# Patient Record
Sex: FEMALE | Race: WHITE | Employment: STUDENT | URBAN - NONMETROPOLITAN AREA
[De-identification: names, ages, dates, MRNs, and addresses within clinical notes are randomized per-mention and may not be internally consistent; named-entity substitution may affect disease eponyms.]

---

## 2022-12-13 ENCOUNTER — OFFICE VISIT (OUTPATIENT)
Dept: ENT CLINIC | Age: 22
End: 2022-12-13

## 2022-12-13 VITALS
BODY MASS INDEX: 21.52 KG/M2 | SYSTOLIC BLOOD PRESSURE: 106 MMHG | WEIGHT: 114 LBS | HEIGHT: 61 IN | DIASTOLIC BLOOD PRESSURE: 70 MMHG

## 2022-12-13 DIAGNOSIS — H61.23 BILATERAL IMPACTED CERUMEN: Primary | ICD-10-CM

## 2022-12-13 PROCEDURE — 69210 REMOVE IMPACTED EAR WAX UNI: CPT | Performed by: PHYSICIAN ASSISTANT

## 2022-12-13 PROCEDURE — 99202 OFFICE O/P NEW SF 15 MIN: CPT | Performed by: PHYSICIAN ASSISTANT

## 2022-12-13 RX ORDER — SPIRONOLACTONE 50 MG/1
TABLET, FILM COATED ORAL
COMMUNITY
Start: 2022-11-30

## 2022-12-13 RX ORDER — PAROXETINE HYDROCHLORIDE 20 MG/1
20 TABLET, FILM COATED ORAL EVERY MORNING
COMMUNITY

## 2022-12-13 ASSESSMENT — ENCOUNTER SYMPTOMS
FACIAL SWELLING: 0
RHINORRHEA: 0
TROUBLE SWALLOWING: 0
SORE THROAT: 0
EYE PAIN: 0
SINUS PAIN: 0
VOICE CHANGE: 0
SINUS PRESSURE: 0
EYE DISCHARGE: 0

## 2022-12-13 NOTE — PROGRESS NOTES
[facility] OTOLARYNGOLOGY/ENT  Delphine Cohen is a pleasant 51-year-old  female that was referred by Dr. Jamaica Montes due to problems with cerumen impaction and tinnitus. Patient reports that the tinnitus was noted after she had appreciated diminished hearing that was mostly notable to the right. She has a history of having recurrent cerumen impactions due to very small ear canal.  She currently denies any drainage from the ear canal or issues with fever or chills. Allergies: Patient has no known allergies. Current Outpatient Medications   Medication Sig Dispense Refill    PARoxetine (PAXIL) 20 MG tablet Take 20 mg by mouth every morning      spironolactone (ALDACTONE) 50 MG tablet        No current facility-administered medications for this visit. No past surgical history on file. No past medical history on file. No family history on file. Social History     Tobacco Use    Smoking status: Never    Smokeless tobacco: Never   Substance Use Topics    Alcohol use: Never           REVIEW OF SYSTEMS:  all other systems reviewed and are negative  Review of Systems   Constitutional:  Negative for chills and fever. HENT:  Negative for congestion, dental problem, ear discharge, ear pain, facial swelling, hearing loss, nosebleeds, postnasal drip, rhinorrhea, sinus pressure, sinus pain, sneezing, sore throat, tinnitus, trouble swallowing and voice change. Eyes:  Negative for pain and discharge. Neurological:  Negative for dizziness and headaches. Comments:     PHYSICAL EXAM:    /70   Ht 5' 1\" (1.549 m)   Wt 114 lb (51.7 kg)   BMI 21.54 kg/m²   Body mass index is 21.54 kg/m².     General Appearance: well developed  and well nourished  Head/ Face: normocephalic and atraumatic  Vocal Quality: good/ normal  Ears: Right Ear: External: external ears normal Otoscopy Ear Canal: cerumen impaction Otoscopy TM: TM's normal and TM's mobile Left Ear: External: external ears normal Otoscopy Ear Canal: cerumen impaction Otoscopy TM: TM's normal and TM's mobile  Hearing: grossly intact  Nose: nares normal and septum midline  Neck: supple and adenopathy none palpable  Thyroid: normal    Assessment & Plan:    Problem List Items Addressed This Visit       Bilateral impacted cerumen - Primary     Bilateral cerumen impaction-successfully removed with microscopic guidance  Plan: Due to her history of recurring cerumen impactions and the small ear canals, I have recommended a follow-up in 6 months for cerumen check. I reminded the patient to call if she has any questions or problems. Relevant Orders    73736 - OK REMOVE IMPACTED EAR WAX (Completed)       Orders Placed This Encounter   Procedures    39351 - OK REMOVE IMPACTED EAR WAX     With microscopic guidance, the cerumen impaction was removed bilaterally with the assistance of alligator graspers and suction. Patient was noted with a very small ear canal with the right side greater than left. After disimpaction, the TM appeared to be normal with no evidence of infection or perforation. No orders of the defined types were placed in this encounter. Electronically signed by Jenny Toro PA-C on 12/13/22 at 4:42 PM CST        Please note that this chart was generated using dragon dictation software. Although every effort was made to ensure the accuracy of this automated transcription, some errors in transcription may have occurred.

## 2023-05-22 ENCOUNTER — TELEPHONE (OUTPATIENT)
Dept: ENT CLINIC | Age: 23
End: 2023-05-22

## 2023-05-22 NOTE — TELEPHONE ENCOUNTER
Lo requests that Dirk or a Shital Jeff return their call. States she was told to contact one of them about moving appt with Ledy Cedillo. The best time to reach her is Anytime. Thank you.

## 2023-12-07 ENCOUNTER — OFFICE VISIT (OUTPATIENT)
Dept: ENT CLINIC | Age: 23
End: 2023-12-07
Payer: COMMERCIAL

## 2023-12-07 VITALS
SYSTOLIC BLOOD PRESSURE: 110 MMHG | HEIGHT: 61 IN | WEIGHT: 111 LBS | DIASTOLIC BLOOD PRESSURE: 76 MMHG | BODY MASS INDEX: 20.96 KG/M2

## 2023-12-07 DIAGNOSIS — H61.23 BILATERAL IMPACTED CERUMEN: Primary | ICD-10-CM

## 2023-12-07 PROCEDURE — 69210 REMOVE IMPACTED EAR WAX UNI: CPT | Performed by: PHYSICIAN ASSISTANT

## 2023-12-07 PROCEDURE — 99212 OFFICE O/P EST SF 10 MIN: CPT | Performed by: PHYSICIAN ASSISTANT

## 2023-12-07 NOTE — PROGRESS NOTES
Markell Dunn is a pleasant 66-year-old  female that presents for a 6-month cerumen check. Patient reports that she has noticed gradual diminished hearing as well as complaints from her coworkers that she cannot hear. She denies any drainage from the canals or any additional problems. 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 Physical examination with the microscope demonstrated the patient to have bilateral cerumen impactions to be present. This was removed bilaterally with alligator graspers and suction with no complications. After disimpaction, the TMs appeared to be normal.  Neck exam demonstrated no lymphadenopathy or thyromegaly. Patient was incidentally found to have a small lipoma to the left clavicular area. Impression: Successful cerumen disimpaction with microscopy and instrumentation-bilateral    Plan: Due to the patient moving to [place] [facility], I have recommended the patient to see Leon Ge PA-C at [facility] for continual care of her cerumen impactions. I recommended her to see Gricelda Engel in 6 months. Patient was advised to call if she has any questions or problems.       Electronically signed by Jose Luis Garcia PA-C on 12/7/23 at 3:05 PM CST

## 2024-10-29 ENCOUNTER — TELEPHONE (OUTPATIENT)
Dept: ENT CLINIC | Age: 24
End: 2024-10-29

## 2024-10-29 NOTE — TELEPHONE ENCOUNTER
Attempted to contact patient but unable to leave VM. Patient will need to see Primary care provider for a referral to ENT in [place].

## 2025-07-10 ENCOUNTER — TELEPHONE (OUTPATIENT)
Dept: ENT CLINIC | Age: 25
End: 2025-07-10

## 2025-07-10 NOTE — TELEPHONE ENCOUNTER
I called and left a message on the patient's cell phone voicemail.  I recommended [place], [place] for her ENT care to this being closer to [facility].  She is recommended to see the Ebenezer James at [facility].  She will have to have her primary care physician to refer her due to her last appointment here being in 2023.      Electronically signed by MAXIMO GIBSON PA-C on 7/10/25 at 4:04 PM DINOT

## 2025-07-10 NOTE — TELEPHONE ENCOUNTER
requesting referral to different ent due to where pt lives. Our recommendation and to let her know when sent